# Patient Record
Sex: FEMALE | Race: BLACK OR AFRICAN AMERICAN
[De-identification: names, ages, dates, MRNs, and addresses within clinical notes are randomized per-mention and may not be internally consistent; named-entity substitution may affect disease eponyms.]

---

## 2021-07-12 ENCOUNTER — HOSPITAL ENCOUNTER (EMERGENCY)
Dept: HOSPITAL 63 - ER | Age: 56
Discharge: HOME | End: 2021-07-12
Payer: MEDICAID

## 2021-07-12 VITALS — HEIGHT: 72 IN | BODY MASS INDEX: 33.15 KG/M2 | WEIGHT: 244.71 LBS

## 2021-07-12 VITALS
SYSTOLIC BLOOD PRESSURE: 113 MMHG | DIASTOLIC BLOOD PRESSURE: 62 MMHG | DIASTOLIC BLOOD PRESSURE: 62 MMHG | SYSTOLIC BLOOD PRESSURE: 113 MMHG

## 2021-07-12 DIAGNOSIS — M06.9: Primary | ICD-10-CM

## 2021-07-12 DIAGNOSIS — Z98.84: ICD-10-CM

## 2021-07-12 DIAGNOSIS — Z88.5: ICD-10-CM

## 2021-07-12 LAB
ALBUMIN SERPL-MCNC: 3.1 G/DL (ref 3.4–5)
ALBUMIN/GLOB SERPL: 0.8 {RATIO} (ref 1–1.7)
ALP SERPL-CCNC: 118 U/L (ref 46–116)
ALT SERPL-CCNC: 33 U/L (ref 14–59)
ANION GAP SERPL CALC-SCNC: 10 MMOL/L (ref 6–14)
APTT PPP: YELLOW S
AST SERPL-CCNC: 28 U/L (ref 15–37)
BACTERIA #/AREA URNS HPF: 0 /HPF
BASOPHILS # BLD AUTO: 0.1 X10^3/UL (ref 0–0.2)
BASOPHILS NFR BLD: 1 % (ref 0–3)
BILIRUB SERPL-MCNC: 0.4 MG/DL (ref 0.2–1)
BILIRUB UR QL STRIP: (no result)
BUN/CREAT SERPL: 11 (ref 6–20)
CA-I SERPL ISE-MCNC: 11 MG/DL (ref 7–20)
CALCIUM SERPL-MCNC: 8.7 MG/DL (ref 8.5–10.1)
CHLORIDE SERPL-SCNC: 104 MMOL/L (ref 98–107)
CO2 SERPL-SCNC: 29 MMOL/L (ref 21–32)
CREAT SERPL-MCNC: 1 MG/DL (ref 0.6–1)
EOSINOPHIL NFR BLD: 1.2 X10^3/UL (ref 0–0.7)
EOSINOPHIL NFR BLD: 22 % (ref 0–3)
ERYTHROCYTE [DISTWIDTH] IN BLOOD BY AUTOMATED COUNT: 14.9 % (ref 11.5–14.5)
FIBRINOGEN PPP-MCNC: CLEAR MG/DL
GFR SERPLBLD BASED ON 1.73 SQ M-ARVRAT: 69.4 ML/MIN
GLOBULIN SER-MCNC: 4.1 G/DL (ref 2.2–3.8)
GLUCOSE SERPL-MCNC: 91 MG/DL (ref 70–99)
GLUCOSE UR STRIP-MCNC: (no result) MG/DL
HCT VFR BLD CALC: 40.4 % (ref 36–47)
HGB BLD-MCNC: 13.6 G/DL (ref 12–15.5)
LYMPHOCYTES # BLD: 1.7 X10^3/UL (ref 1–4.8)
LYMPHOCYTES NFR BLD AUTO: 32 % (ref 24–48)
MCH RBC QN AUTO: 29 PG (ref 25–35)
MCHC RBC AUTO-ENTMCNC: 34 G/DL (ref 31–37)
MCV RBC AUTO: 86 FL (ref 79–100)
MONO #: 0.6 X10^3/UL (ref 0–1.1)
MONOCYTES NFR BLD: 11 % (ref 0–9)
NEUT #: 1.9 X10^3UL (ref 1.8–7.7)
NEUTROPHILS NFR BLD AUTO: 35 % (ref 31–73)
NITRITE UR QL STRIP: (no result)
PLATELET # BLD AUTO: 327 X10^3/UL (ref 140–400)
POTASSIUM SERPL-SCNC: 4.1 MMOL/L (ref 3.5–5.1)
PROT SERPL-MCNC: 7.2 G/DL (ref 6.4–8.2)
RBC # BLD AUTO: 4.69 X10^6/UL (ref 3.5–5.4)
RBC #/AREA URNS HPF: 0 /HPF (ref 0–2)
SODIUM SERPL-SCNC: 143 MMOL/L (ref 136–145)
SP GR UR STRIP: 1.02
UROBILINOGEN UR-MCNC: 0.2 MG/DL
WBC # BLD AUTO: 5.4 X10^3/UL (ref 4–11)
WBC #/AREA URNS HPF: 0 /HPF (ref 0–4)

## 2021-07-12 PROCEDURE — 96360 HYDRATION IV INFUSION INIT: CPT

## 2021-07-12 PROCEDURE — 81001 URINALYSIS AUTO W/SCOPE: CPT

## 2021-07-12 PROCEDURE — 80053 COMPREHEN METABOLIC PANEL: CPT

## 2021-07-12 PROCEDURE — 70450 CT HEAD/BRAIN W/O DYE: CPT

## 2021-07-12 PROCEDURE — 71045 X-RAY EXAM CHEST 1 VIEW: CPT

## 2021-07-12 PROCEDURE — 99285 EMERGENCY DEPT VISIT HI MDM: CPT

## 2021-07-12 PROCEDURE — 36415 COLL VENOUS BLD VENIPUNCTURE: CPT

## 2021-07-12 PROCEDURE — 85025 COMPLETE CBC W/AUTO DIFF WBC: CPT

## 2021-07-12 NOTE — RAD
Exam Date:  7/12/2021 1:23 PM



CT HEAD/BRAIN WO



Indication: Reason: dizziness / Spl. Instructions:  / History: .



TECHNIQUE:  Head CT was performed without intravenous contrast.  One or more of the following dose re
duction techniques were utilized:

*Automated exposure control (AEC)

*Adjustment of mA and/or kV according to patient size

*Use of iterative reconstruction technique

*CT scan done according to ALARA, or ALARA/IMAGE GENTLY



FINDINGS: 



The ventricles and sulci are normal for the patient's stated age.   There is no evidence of acute int
racranial hemorrhage, extra-axial collection, mass effect, midline shift, or acute territorial infarc
t. No lesion of the skull base or the calvarium is seen. The visualized paranasal sinuses, mastoid ai
r cells and orbits are normal in appearance. 



IMPRESSION: 



No evidence for acute intracranial abnormality.  







Electronically signed by: Khalif Christian MD (7/12/2021 1:36 PM) [id number]

## 2021-07-12 NOTE — PHYS DOC
Past History


Additional Past Medical Histor:  Rheumatoid arthritis


Past Surgical History:  Hip Replacement, Other


Additional Past Surgical Histo:  gastric bypass


Alcohol Use:  None





Adult General


Chief Complaint


Chief Complaint:  MULTIPLE COMPLAINTS





[facility]


HPI


Patient is a 56-year-old female with a past medical history of rheumatoid 

arthritis and gastric bypass who presents to the emergency room complaining of 

dried cracked feet, paresthesias in legs and arms, and concern for dehydration. 

Patient states she had the same symptoms about a year ago and was told at that 

time that she was dehydrated.  Patient states she is having a hard time getting 

a lot of fluid in.  She denies any nausea or vomiting.  Patient states it feels 

like her legs and arms are numb.  She then states that she is able to feel her 

arms and legs without difficulty but she believes that they are numb.  She 

denies any tingling sensation.





Review of Systems


Review of Systems


Complete ROS is negative unless otherwise documented in HPI





Current Medications


Current Medications





Current Medications








 Medications


  (Trade)  Dose


 Ordered  Sig/Rodney  Start Time


 Stop Time Status Last Admin


Dose Admin


 


 Sodium Chloride  1,000 ml @ 


 1,000 mls/hr  1X  ONCE  7/12/21 11:45


 7/12/21 12:44 DC 7/12/21 12:23


1,000 MLS/HR











Allergies


Allergies





Allergies








Coded Allergies Type Severity Reaction Last Updated Verified


 


  morphine Allergy Unknown  7/12/21 Yes











Physical Exam


Physical Exam


General: Awake, alert, NAD. Well Nourished, well hydrated. Cooperative


HEENT: Atraumatic, EOMI, PERRL, airway patent, moist oral mucosa


Neck: Supple, trachea midline


Respiratory: CTA bilaterally, normal effort, no wheezing/crackles


CV: RRR, no murmur, cap refill <2


GI: Soft, nondistended, nontender, no masses


MSK: No obvious deformities


Skin: Warm, dry, intact


Neuro: A&O x3, speech NL, sensory and motor grossly intact, no focal deficits, 

intact sensation in all 4 extremities, intact range of motion


Psych: Normal affect, normal mood, not suicidal or homicidal





Current Patient Data


Vital Signs





                                   Vital Signs








  Date Time  Temp Pulse Resp B/P (MAP) Pulse Ox O2 Delivery O2 Flow Rate FiO2


 


7/12/21 11:04 98.4 92 16 140/90 98 Room Air  











EKG


EKG


[]





Radiology/Procedures


Radiology/Procedures


[]





Heart Score


C/O Chest Pain:  N/A


Risk Factors:


Risk Factors:  DM, Current or recent (<one month) smoker, HTN, HLP, family 

history of CAD, obesity.


Risk Scores:


Risk Factors:  DM, Current or recent (<one month) smoker, HTN, HLP, family 

history of CAD, obesity.





Course & Med Decision Making


Course & Med Decision Making


Pertinent Labs and Imaging studies reviewed. (See chart for details)





Patient is a 56-year-old female with a past medical history of gastric bypass 

and RA who presents to the emergency room multiple complaints.  Patient is 

overall well-appearing.  She is concerned about her cracked feet but her feet at

 this time appear normal other than some mild dry skin.  Patient has intact 

sensation.  It is unclear what patient means by she feels none.  She states she 

is able to feel and denies any kind of paresthesias feelings.  Her neurologic 

exam is normal.  Lab work and CT are unremarkable.  Patient is stable at this 

time it does not appear to have a life or limb threatening illness.  Patient's 

test results and vitals while in the ED were fully reviewed and discussed with 

the patient. Patient is stable and at this time does not need admission to the 

hospital. We have discussed strict return precautions and the importance of 

following up with their Primary Care Physician. Patient stated understanding and

 was given an opportunity to ask any questions. Patient is in agreement with 

plan.





Dragon Disclaimer


Dragon Disclaimer


This electronic medical record was generated, in whole or in part, using a voice

 recognition dictation system.





Departure


Departure:


Impression:  


   Primary Impression:  


   Rheumatoid arthritis flare


Disposition:  01 HOME / SELF CARE / HOMELESS


Condition:  STABLE


Referrals:  


PCP,NO (PCP)


Patient Instructions:  Dehydration, Adult, Paresthesia











ALEXANDER LYLES MD            Jul 12, 2021 12:49

## 2021-07-12 NOTE — RAD
Exam Date:  7/12/2021 1:23 PM



XR CHEST 1V



Indication: Reason: dizziness / Spl. Instructions:  / History: .  







FINDINGS/

IMPRESSION:





The cardiac silhouette and pulmonary vasculature are within normal limits.  

There is no focal consolidation, pleural effusion or pneumothorax.  

The visualized osseous structures are intact.





Electronically signed by: Khalif Christian MD (7/12/2021 1:36 PM) [id number]

## 2021-10-15 ENCOUNTER — HOSPITAL ENCOUNTER (OUTPATIENT)
Dept: HOSPITAL 63 - MAMMO | Age: 56
End: 2021-10-15
Payer: MEDICAID

## 2021-10-15 DIAGNOSIS — R92.8: Primary | ICD-10-CM

## 2021-10-15 PROCEDURE — 77065 DX MAMMO INCL CAD UNI: CPT

## 2021-10-15 NOTE — RAD
EXAM: Unilateral digital diagnostic mammography, left.



HISTORY: Asymmetry on mammographic screening. Additional imaging is requested.



TECHNIQUE: Left full field digital images were obtained in true lateral and spot compression CC proje
ctions. Computer-aided detection was applied.



COMPARISON: 09/22/2021.



COMPOSITION: A. The breasts are almost entirely fatty.



FINDINGS: The asymmetric density of concern on the left CC view does not persist on additional images
. There are no suspicious masses, microcalcifications or architectural distortion. Scattered calcific
ations are benign.



BI-RADS CATEGORY 2: Benign.



RECOMMENDATION:

1. Resume bilateral mammography in one year.



If mammography demonstrates dense breast tissue (heterogenously dense or extremely dense, category C 
or D), which could hide abnormalities, and if other risk factors for breast cancer have been identifi
ed, supplemental screening tests that may be suggested by the ordering physician may be of benefit. D
ense breast tissue, in and of itself, is a relatively common condition. Therefore, this information i
s not provided to cause undue concern, but rather to raise awareness and to promote discussion with t
he referring physician regarding the presence of other risk factors, in addition to dense breast tiss
ue. The results of this mammography examination is provided to the patient and referring physician. T
he patient should contact their referring physician if any questions or concerns exist regarding this
 report.



PQRS compliance statement -  Patient information was entered into a reminder system with a target due
 date for the next mammogram. 



"Our facility is accredited by the [employer] Mammography Program."



Electronically signed by: WILL Edmond MD (10/15/2021 11:15 AM) [id number]